# Patient Record
Sex: FEMALE | Race: WHITE | Employment: OTHER | URBAN - METROPOLITAN AREA
[De-identification: names, ages, dates, MRNs, and addresses within clinical notes are randomized per-mention and may not be internally consistent; named-entity substitution may affect disease eponyms.]

---

## 2017-09-12 ENCOUNTER — OFFICE VISIT (OUTPATIENT)
Dept: INTERNAL MEDICINE CLINIC | Age: 57
End: 2017-09-12

## 2017-09-12 VITALS
OXYGEN SATURATION: 96 % | BODY MASS INDEX: 32.21 KG/M2 | WEIGHT: 225 LBS | RESPIRATION RATE: 18 BRPM | DIASTOLIC BLOOD PRESSURE: 72 MMHG | HEART RATE: 74 BPM | SYSTOLIC BLOOD PRESSURE: 119 MMHG | TEMPERATURE: 97.5 F | HEIGHT: 70 IN

## 2017-09-12 DIAGNOSIS — H61.21 IMPACTED CERUMEN OF RIGHT EAR: Primary | ICD-10-CM

## 2017-09-12 DIAGNOSIS — H66.001 ACUTE SUPPURATIVE OTITIS MEDIA OF RIGHT EAR WITHOUT SPONTANEOUS RUPTURE OF TYMPANIC MEMBRANE, RECURRENCE NOT SPECIFIED: ICD-10-CM

## 2017-09-12 PROBLEM — H61.20 CERUMEN IMPACTION: Status: ACTIVE | Noted: 2017-09-12

## 2017-09-12 RX ORDER — OFLOXACIN 3 MG/ML
5 SOLUTION AURICULAR (OTIC) 2 TIMES DAILY
Qty: 5 ML | Refills: 0 | Status: SHIPPED | OUTPATIENT
Start: 2017-09-12 | End: 2017-09-12

## 2017-09-12 RX ORDER — NEOMYCIN SULFATE, POLYMYXIN B SULFATE AND HYDROCORTISONE 10; 3.5; 1 MG/ML; MG/ML; [USP'U]/ML
3 SUSPENSION/ DROPS AURICULAR (OTIC) 4 TIMES DAILY
Qty: 10 ML | Refills: 0 | Status: SHIPPED | OUTPATIENT
Start: 2017-09-12 | End: 2017-09-19

## 2017-09-12 NOTE — PROGRESS NOTES
I performed a right  ear irrigation. Ear wax was removed from right  ear. Patient tolerated the procedure well during and after.

## 2017-09-12 NOTE — PROGRESS NOTES
HISTORY OF PRESENT ILLNESS  Allegra Fisher is a 60 y.o. female. Ear Fullness    The history is provided by the patient. This is a new problem. The current episode started more than 1 week ago. The problem occurs constantly. The problem has not changed since onset. Patient complains that the right ear is affected. There has been no fever. The pain is at a severity of 1/10. The pain is mild. Associated symptoms include hearing loss. Pertinent negatives include no ear discharge, no headaches, no sore throat, no vomiting, no cough and no rash. Her past medical history does not include chronic ear infection. Review of Systems   Constitutional: Negative for chills, fever and malaise/fatigue. HENT: Positive for ear pain and hearing loss. Negative for congestion, ear discharge, nosebleeds, sinus pain, sore throat and tinnitus. Eyes: Negative for blurred vision, double vision and photophobia. Respiratory: Negative for cough, shortness of breath and stridor. Cardiovascular: Negative for chest pain and palpitations. Gastrointestinal: Negative for heartburn, nausea and vomiting. Genitourinary: Negative for dysuria, frequency and urgency. Musculoskeletal: Negative for myalgias. Skin: Negative for rash. Neurological: Negative for dizziness and headaches. Endo/Heme/Allergies: Negative for environmental allergies and polydipsia. Psychiatric/Behavioral: The patient is not nervous/anxious. Physical Exam   Constitutional: She is oriented to person, place, and time. She appears well-developed and well-nourished. No distress. HENT:   Head: Normocephalic and atraumatic. Right Ear: Decreased hearing is noted. Left Ear: Hearing, tympanic membrane and external ear normal.   Nose: No mucosal edema. Mouth/Throat: No oropharyngeal exudate or posterior oropharyngeal edema. Cerumen impaction in right ear. Eyes: EOM are normal. Pupils are equal, round, and reactive to light.  Right eye exhibits no discharge. Left eye exhibits no discharge. Neck: Neck supple. Cardiovascular: Regular rhythm. Pulmonary/Chest: Breath sounds normal. No respiratory distress. She has no wheezes. Lymphadenopathy:     She has no cervical adenopathy. Neurological: She is alert and oriented to person, place, and time. No cranial nerve deficit. Skin: Skin is warm. She is not diaphoretic. Psychiatric: She has a normal mood and affect. Nursing note and vitals reviewed. ASSESSMENT and PLAN    ICD-10-CM ICD-9-CM    1. Impacted cerumen of right ear H61.21 380.4 REMOVAL IMPACTED CERUMEN IRRIGATION/LVG UNILAT   2. Acute suppurative otitis media of right ear without spontaneous rupture of tympanic membrane, recurrence not specified H66.001 382.00 neomycin-polymyxin-hydrocortisone, buffered, (PEDIOTIC) 3.5-10,000-1 mg/mL-unit/mL-% otic suspension      DISCONTINUED: ofloxacin (FLOXIN) 0.3 % otic solution   Patient advised to take medication as prescribed. Take rest and plenty of fluids. Alternate tylenol and ibuprofen for fever. Return to clinic if condition worsens in next 72 hours.

## 2017-09-12 NOTE — MR AVS SNAPSHOT
Visit Information Date & Time Provider Department Dept. Phone Encounter #  
 9/12/2017  4:15 PM Lita Cotto NP [employer] [phone] [id number] Upcoming Health Maintenance Date Due Hepatitis C Screening 1960 DTaP/Tdap/Td series (1 - Tdap) 9/18/1979 PAP AKA CERVICAL CYTOLOGY 9/18/1979 FOBT Q 1 YEAR AGE 50-75 9/18/2008 INFLUENZA AGE 9 TO ADULT 8/1/2017 BREAST CANCER SCRN MAMMOGRAM 9/4/2017 Allergies as of 9/12/2017  Review Complete On: 9/12/2017 By: Lita Cotto NP No Known Allergies Current Immunizations  Never Reviewed No immunizations on file. Not reviewed this visit You Were Diagnosed With   
  
 Codes Comments Impacted cerumen of right ear    -  Primary ICD-10-CM: H61.21 ICD-9-CM: 380.4 Acute suppurative otitis media of right ear without spontaneous rupture of tympanic membrane, recurrence not specified     ICD-10-CM: H66.001 ICD-9-CM: 382.00 Vitals BP Pulse Temp Resp Height(growth percentile) Weight(growth percentile) 119/72 (BP 1 Location: Right arm, BP Patient Position: Sitting) 74 97.5 °F (36.4 °C) (Oral) 18 5' 10\" (1.778 m) 225 lb (102.1 kg) SpO2 BMI OB Status Smoking Status 96% 32.28 kg/m2 Postmenopausal Never Smoker Vitals History BMI and BSA Data Body Mass Index Body Surface Area  
 32.28 kg/m 2 2.25 m 2 Preferred Pharmacy Pharmacy Name Phone [facility] [address] [phone] Your Updated Medication List  
  
   
This list is accurate as of: 9/12/17  4:41 PM.  Always use your most recent med list.  
  
  
  
  
 ofloxacin 0.3 % otic solution Commonly known as:  FLOXIN Administer 5 Drops in right ear two (2) times a day for 7 days. Prescriptions Sent to Pharmacy  Refills  
 ofloxacin (FLOXIN) 0.3 % otic solution 0  
 Sig: Administer 5 Drops in right ear two (2) times a day for 7 days. Class: Normal  
 Pharmacy: [id number] [place] [address] #: [phone] Route: Right Ear We Performed the Following REMOVAL IMPACTED CERUMEN IRRIGATION/LVG UNILAT [id number] CPT(R)] Introducing [facility]! Sara Braswell introduces [employer] patient portal. Now you can access parts of your medical record, email your doctor's office, and request medication refills online. 1. In your internet browser, go to [email or web address] 2. Click on the First Time User? Click Here link in the Sign In box. You will see the New Member Sign Up page. 3. Enter your [employer] Access Code exactly as it appears below. You will not need to use this code after youve completed the sign-up process. If you do not sign up before the expiration date, you must request a new code. · [employer] Access Code: [email or web address] Expires: 12/11/2017  4:41 PM 
 
4. Enter the last four digits of your Social Security Number (xxxx) and Date of Birth (mm/dd/yyyy) as indicated and click Submit. You will be taken to the next sign-up page. 5. Create a [employer] ID. This will be your [employer] login ID and cannot be changed, so think of one that is secure and easy to remember. 6. Create a [employer] password. You can change your password at any time. 7. Enter your Password Reset Question and Answer. This can be used at a later time if you forget your password. 8. Enter your e-mail address. You will receive e-mail notification when new information is available in [address] 9. Click Sign Up. You can now view and download portions of your medical record. 10. Click the Download Summary menu link to download a portable copy of your medical information. If you have questions, please visit the Frequently Asked Questions section of the [employer] website.  Remember, [employer] is NOT to be used for urgent needs. For medical emergencies, dial 911. Now available from your iPhone and Android! Please provide this summary of care documentation to your next provider. Your primary care clinician is listed as Mary Barnes. If you have any questions after today's visit, please call [phone].

## 2017-09-12 NOTE — PROGRESS NOTES
ROOM # 9    Jess Parent presents today for   Chief Complaint   Patient presents with    Ear Fullness     both ears    Hoarse       Jess Parent preferred language for health care discussion is english/other. Is someone accompanying this pt? no    Is the patient using any DME equipment during OV? no    Depression Screening:  PHQ over the last two weeks 9/12/2017 5/12/2016   Little interest or pleasure in doing things Not at all Not at all   Feeling down, depressed or hopeless Not at all Not at all   Total Score PHQ 2 0 0       Learning Assessment:  Learning Assessment 5/12/2016   PRIMARY LEARNER Patient   HIGHEST LEVEL OF EDUCATION - PRIMARY LEARNER  > 4 YEARS OF COLLEGE   BARRIERS PRIMARY LEARNER NONE   PRIMARY LANGUAGE ENGLISH   LEARNER PREFERENCE PRIMARY READING   LEARNING SPECIAL TOPICS no   ANSWERED BY patient    RELATIONSHIP SELF       Abuse Screening:  Abuse Screening Questionnaire 9/12/2017   Do you ever feel afraid of your partner? N   Are you in a relationship with someone who physically or mentally threatens you? N   Is it safe for you to go home? Y       Fall Risk  N/I    Health Maintenance reviewed and discussed per provider. Yes    Jess Parent is due for flu injection . Please order/place referral if appropriate. Advance Directive:  1. Do you have an advance directive in place? Patient Reply: no    2. If not, would you like material regarding how to put one in place? Patient Reply: no    Coordination of Care:  1. Have you been to the ER, urgent care clinic since your last visit? Hospitalized since your last visit? no    2. Have you seen or consulted any other health care providers outside of the [address] [place] [address] since your last visit? Include any pap smears or colon screening.  no

## 2017-09-18 ENCOUNTER — OFFICE VISIT (OUTPATIENT)
Dept: INTERNAL MEDICINE CLINIC | Age: 57
End: 2017-09-18

## 2017-09-18 VITALS
HEIGHT: 70 IN | TEMPERATURE: 98 F | DIASTOLIC BLOOD PRESSURE: 81 MMHG | SYSTOLIC BLOOD PRESSURE: 120 MMHG | OXYGEN SATURATION: 98 % | RESPIRATION RATE: 18 BRPM | HEART RATE: 76 BPM

## 2017-09-18 DIAGNOSIS — H91.91 DECREASED HEARING OF RIGHT EAR: Primary | ICD-10-CM

## 2017-09-18 RX ORDER — OFLOXACIN 3 MG/ML
SOLUTION AURICULAR (OTIC)
Refills: 0 | COMMUNITY
Start: 2017-09-12

## 2017-09-18 RX ORDER — CLOTRIMAZOLE AND BETAMETHASONE DIPROPIONATE 10; .64 MG/G; MG/G
CREAM TOPICAL
Refills: 0 | COMMUNITY
Start: 2017-09-15

## 2017-09-18 NOTE — PATIENT INSTRUCTIONS
Ear Infection (Otitis Media): Care Instructions  Your Care Instructions    An ear infection may start with a cold and affect the middle ear (otitis media). It can hurt a lot. Most ear infections clear up on their own in a couple of days. Most often you will not need antibiotics. This is because many ear infections are caused by a virus. Antibiotics don't work against a virus. Regular doses of pain medicines are the best way to reduce your fever and help you feel better. Follow-up care is a key part of your treatment and safety. Be sure to make and go to all appointments, and call your doctor if you are having problems. It's also a good idea to know your test results and keep a list of the medicines you take. How can you care for yourself at home? · Take pain medicines exactly as directed. ¨ If the doctor gave you a prescription medicine for pain, take it as prescribed. ¨ If you are not taking a prescription pain medicine, take an over-the-counter medicine, such as acetaminophen (Tylenol), ibuprofen (Advil, Motrin), or naproxen (Aleve). Read and follow all instructions on the label. ¨ Do not take two or more pain medicines at the same time unless the doctor told you to. Many pain medicines have acetaminophen, which is Tylenol. Too much acetaminophen (Tylenol) can be harmful. · Plan to take a full dose of pain reliever before bedtime. Getting enough sleep will help you get better. · Try a warm, moist washcloth on the ear. It may help relieve pain. · If your doctor prescribed antibiotics, take them as directed. Do not stop taking them just because you feel better. You need to take the full course of antibiotics. When should you call for help? Call your doctor now or seek immediate medical care if:  · You have new or increasing ear pain. · You have new or increasing pus or blood draining from your ear. · You have a fever with a stiff neck or a severe headache.   Watch closely for changes in your health, and be sure to contact your doctor if:  · You have new or worse symptoms. · You are not getting better after taking an antibiotic for 2 days. Where can you learn more? Go to http://anne-prasad.info/. Enter I541 in the search box to learn more about \"Ear Infection (Otitis Media): Care Instructions. \"  Current as of: May 4, 2017  Content Version: 11.3  © 7418-8009 [employer]. Care instructions adapted under license by [employer] (which disclaims liability or warranty for this information). If you have questions about a medical condition or this instruction, always ask your healthcare professional. [address] any warranty or liability for your use of this information. Hearing Loss: Care Instructions  Your Care Instructions  Hearing loss is a sudden or slow decrease in how well you hear. It can range from mild to severe. Permanent hearing loss can occur with aging. It also can happen when you are exposed long-term to loud noise. Examples include listening to loud music, riding motorcycles, or being around other loud machines. Hearing loss can affect your work and home life. It can make you feel lonely or depressed. You may feel that you have lost your independence. But hearing aids and other devices can help you hear better and feel connected to others. Follow-up care is a key part of your treatment and safety. Be sure to make and go to all appointments, and call your doctor if you are having problems. It's also a good idea to know your test results and keep a list of the medicines you take. How can you care for yourself at home? · Avoid loud noises whenever possible. This helps keep your hearing from getting worse. · Always wear hearing protection around loud noises. · Wear a hearing aid as directed. See a person who can help you pick a hearing aid that fits you. · Have hearing tests as your doctor suggests.  They can show whether your hearing has changed. Your hearing aid may need to be adjusted. · Use other devices as needed. These may include:  ¨ Telephone amplifiers and hearing aids that can connect to a television, stereo, radio, or microphone. ¨ Devices that use lights or vibrations. These alert you to the doorbell, a ringing telephone, or a baby monitor. ¨ Television closed-captioning. This shows the words at the bottom of the screen. Most new TVs can do this. Sammieus Dixon (text telephone). This lets you type messages back and forth on the telephone instead of talking or listening. These devices are also called TDD. When messages are typed on the keyboard, they are sent over the phone line to a receiving TTY. The message is shown on a monitor. · Use pagers, fax machines, and email if it is hard for you to communicate by telephone. · Try to learn a listening technique called speech-reading. It is not lip-reading. You pay attention to people's gestures, expressions, posture, and tone of voice. These clues can help you understand what a person is saying. Face the person you are talking to, and have him or her face you. Make sure the lighting is good. You need to see the other person's face clearly. · Think about counseling if you need help to adjust to your hearing loss. When should you call for help? Watch closely for changes in your health, and be sure to contact your doctor if:  · You think your hearing is getting worse. · You have new symptoms, such as dizziness or nausea. Where can you learn more? Go to http://anne-prasad.info/. Enter Y579 in the search box to learn more about \"Hearing Loss: Care Instructions. \"  Current as of: July 29, 2016  Content Version: 11.3  © 6684-0924 [employer]. Care instructions adapted under license by [employer] (which disclaims liability or warranty for this information).  If you have questions about a medical condition or this instruction, always ask your healthcare professional. [address] any warranty or liability for your use of this information.

## 2017-09-18 NOTE — PROGRESS NOTES
HISTORY OF PRESENT ILLNESS  Lucius Cancino is a 59 y.o. female. Hearing Problem    The history is provided by the patient. This is a new problem. The current episode started more than 1 week ago. The problem occurs constantly. The problem has not changed since onset. Patient complains that the right ear is affected. There has been no fever. The pain is at a severity of 0/10. The patient is experiencing no pain. Associated symptoms include hearing loss. Pertinent negatives include no ear discharge, no headaches, no rhinorrhea, no sore throat, no abdominal pain, no diarrhea, no vomiting, no neck pain, no cough and no rash. Her past medical history is significant for chronic ear infection. Her past medical history does not include hearing loss or tympanostomy tube. Review of Systems   Constitutional: Negative for chills and fever. HENT: Positive for hearing loss. Negative for congestion, ear discharge, ear pain, nosebleeds, rhinorrhea, sinus pain, sore throat and tinnitus. Eyes: Negative for blurred vision, double vision, photophobia, pain and discharge. Respiratory: Negative for cough, sputum production, shortness of breath and stridor. Cardiovascular: Negative for chest pain and palpitations. Gastrointestinal: Negative for abdominal pain, diarrhea, heartburn, nausea and vomiting. Musculoskeletal: Negative for myalgias and neck pain. Skin: Negative for rash. Neurological: Negative for dizziness, tingling, tremors, sensory change and headaches. Endo/Heme/Allergies: Positive for environmental allergies. Negative for polydipsia. Psychiatric/Behavioral: Negative for depression. The patient is not nervous/anxious. Physical Exam   Constitutional: She is oriented to person, place, and time. She appears well-developed and well-nourished. No distress. HENT:   Head: Normocephalic and atraumatic. Right Ear: No drainage, swelling or tenderness. No foreign bodies.  Tympanic membrane is injected and erythematous. Tympanic membrane is not bulging. Tympanic membrane mobility is normal. No middle ear effusion. Decreased hearing is noted. Left Ear: External ear normal. No drainage. Tympanic membrane is not injected and not erythematous. No decreased hearing is noted. Mouth/Throat: Oropharynx is clear and moist. No oropharyngeal exudate. Eyes: EOM are normal. Pupils are equal, round, and reactive to light. Neck: Normal range of motion. Neck supple. Cardiovascular: Regular rhythm. Pulmonary/Chest: Breath sounds normal. No respiratory distress. Lymphadenopathy:     She has no cervical adenopathy. Neurological: She is alert and oriented to person, place, and time. No cranial nerve deficit. Skin: Skin is dry. She is not diaphoretic. No erythema. Psychiatric: She has a normal mood and affect. Nursing note and vitals reviewed. ASSESSMENT and PLAN    ICD-10-CM ICD-9-CM    1.  Decreased hearing of right ear H91.91 389.9 REFERRAL TO ENT-OTOLARYNGOLOGY      PURE TONE HEARING TEST, AIR

## 2017-09-18 NOTE — PROGRESS NOTES
ROOM # 9    Ryanne Huffman presents today for ear infection    Ryanne Huffman preferred language for health care discussion is english/other. Is someone accompanying this pt? no    Is the patient using any DME equipment during OV? no    Depression Screening:  PHQ over the last two weeks 9/12/2017 5/12/2016   Little interest or pleasure in doing things Not at all Not at all   Feeling down, depressed or hopeless Not at all Not at all   Total Score PHQ 2 0 0       Learning Assessment:  Learning Assessment 5/12/2016   PRIMARY LEARNER Patient   HIGHEST LEVEL OF EDUCATION - PRIMARY LEARNER  > 4 YEARS OF COLLEGE   BARRIERS PRIMARY LEARNER NONE   PRIMARY LANGUAGE ENGLISH   LEARNER PREFERENCE PRIMARY READING   LEARNING SPECIAL TOPICS no   ANSWERED BY patient    RELATIONSHIP SELF       Abuse Screening:  Abuse Screening Questionnaire 9/12/2017   Do you ever feel afraid of your partner? N   Are you in a relationship with someone who physically or mentally threatens you? N   Is it safe for you to go home? Y       Fall Risk  No flowsheet data found. Health Maintenance reviewed and discussed per provider. Yes    Ryanne Huffman is due for flu injection . Please order/place referral if appropriate. Advance Directive:  1. Do you have an advance directive in place? Patient Reply: no    2. If not, would you like material regarding how to put one in place? Patient Reply: no    Coordination of Care:  1. Have you been to the ER, urgent care clinic since your last visit? Hospitalized since your last visit? no    2. Have you seen or consulted any other health care providers outside of the [address] [place] [address] since your last visit? Include any pap smears or colon screening.  no

## 2017-09-18 NOTE — MR AVS SNAPSHOT
Visit Information Date & Time Provider Department Dept. Phone Encounter #  
 9/18/2017  4:30 PM Zane Lindsay NP Sherrie Dominguez 139 [id number] Upcoming Health Maintenance Date Due Hepatitis C Screening 1960 DTaP/Tdap/Td series (1 - Tdap) 9/18/1979 PAP AKA CERVICAL CYTOLOGY 9/18/1979 FOBT Q 1 YEAR AGE 50-75 9/18/2008 INFLUENZA AGE 9 TO ADULT 8/1/2017 BREAST CANCER SCRN MAMMOGRAM 9/4/2017 Allergies as of 9/18/2017  Review Complete On: 9/18/2017 By: Evelyn Paniagua No Known Allergies Current Immunizations  Never Reviewed No immunizations on file. Not reviewed this visit You Were Diagnosed With   
  
 Codes Comments Decreased hearing of right ear    -  Primary ICD-10-CM: H91.91 
ICD-9-CM: 389. 9 Vitals BP Pulse Temp Resp Height(growth percentile) SpO2  
 120/81 (BP 1 Location: Right arm, BP Patient Position: Sitting) 76 98 °F (36.7 °C) (Oral) 18 5' 10\" (1.778 m) 98% OB Status Smoking Status Postmenopausal Never Smoker Vitals History Preferred Pharmacy Pharmacy Name Phone [facility] [address] [phone] Your Updated Medication List  
  
   
This list is accurate as of: 9/18/17  4:42 PM.  Always use your most recent med list.  
  
  
  
  
 clotrimazole-betamethasone topical cream  
Commonly known as:  LOTRISONE  
apply QUARTER SIZE AMUNT  twice a day  
  
 neomycin-polymyxin-hydrocortisone (buffered) 3.5-10,000-1 mg/mL-unit/mL-% otic suspension Commonly known as:  Dewitte Ket Administer 3 Drops in right ear four (4) times daily for 7 days. ofloxacin 0.3 % otic solution Commonly known as:  FLOXIN  
instill 5 drops into right ear twice a day for 7 days We Performed the Following REFERRAL TO ENT-OTOLARYNGOLOGY [KLG96 Custom] Comments: Please evaluate patient for decreased hearing post ear infection. Referral Information Referral ID Referred By Referred To  
  
 [id number] Khoa Herrera Not Available Visits Status Start Date End Date 1 New Request 9/18/17 9/18/18 If your referral has a status of pending review or denied, additional information will be sent to support the outcome of this decision. Patient Instructions Ear Infection (Otitis Media): Care Instructions Your Care Instructions An ear infection may start with a cold and affect the middle ear (otitis media). It can hurt a lot. Most ear infections clear up on their own in a couple of days. Most often you will not need antibiotics. This is because many ear infections are caused by a virus. Antibiotics don't work against a virus. Regular doses of pain medicines are the best way to reduce your fever and help you feel better. Follow-up care is a key part of your treatment and safety. Be sure to make and go to all appointments, and call your doctor if you are having problems. It's also a good idea to know your test results and keep a list of the medicines you take. How can you care for yourself at home? · Take pain medicines exactly as directed. ¨ If the doctor gave you a prescription medicine for pain, take it as prescribed. ¨ If you are not taking a prescription pain medicine, take an over-the-counter medicine, such as acetaminophen (Tylenol), ibuprofen (Advil, Motrin), or naproxen (Aleve). Read and follow all instructions on the label. ¨ Do not take two or more pain medicines at the same time unless the doctor told you to. Many pain medicines have acetaminophen, which is Tylenol. Too much acetaminophen (Tylenol) can be harmful. · Plan to take a full dose of pain reliever before bedtime. Getting enough sleep will help you get better. · Try a warm, moist washcloth on the ear. It may help relieve pain. · If your doctor prescribed antibiotics, take them as directed. Do not stop taking them just because you feel better. You need to take the full course of antibiotics. When should you call for help? Call your doctor now or seek immediate medical care if: 
· You have new or increasing ear pain. · You have new or increasing pus or blood draining from your ear. · You have a fever with a stiff neck or a severe headache. Watch closely for changes in your health, and be sure to contact your doctor if: 
· You have new or worse symptoms. · You are not getting better after taking an antibiotic for 2 days. Where can you learn more? Go to http://anne-prasad.info/. Enter B757 in the search box to learn more about \"Ear Infection (Otitis Media): Care Instructions. \" Current as of: May 4, 2017 Content Version: 11.3 © 5216-0939 [employer]. Care instructions adapted under license by [employer] (which disclaims liability or warranty for this information). If you have questions about a medical condition or this instruction, always ask your healthcare professional. [address] any warranty or liability for your use of this information. Hearing Loss: Care Instructions Your Care Instructions Hearing loss is a sudden or slow decrease in how well you hear. It can range from mild to severe. Permanent hearing loss can occur with aging. It also can happen when you are exposed long-term to loud noise. Examples include listening to loud music, riding motorcycles, or being around other loud machines. Hearing loss can affect your work and home life. It can make you feel lonely or depressed. You may feel that you have lost your independence. But hearing aids and other devices can help you hear better and feel connected to others. Follow-up care is a key part of your treatment and safety.  Be sure to make and go to all appointments, and call your doctor if you are having problems. It's also a good idea to know your test results and keep a list of the medicines you take. How can you care for yourself at home? · Avoid loud noises whenever possible. This helps keep your hearing from getting worse. · Always wear hearing protection around loud noises. · Wear a hearing aid as directed. See a person who can help you pick a hearing aid that fits you. · Have hearing tests as your doctor suggests. They can show whether your hearing has changed. Your hearing aid may need to be adjusted. · Use other devices as needed. These may include: ¨ Telephone amplifiers and hearing aids that can connect to a television, stereo, radio, or microphone. ¨ Devices that use lights or vibrations. These alert you to the doorbell, a ringing telephone, or a baby monitor. ¨ Television closed-captioning. This shows the words at the bottom of the screen. Most new TVs can do this. Katina Rand (text telephone). This lets you type messages back and forth on the telephone instead of talking or listening. These devices are also called TDD. When messages are typed on the keyboard, they are sent over the phone line to a receiving TTY. The message is shown on a monitor. · Use pagers, fax machines, and email if it is hard for you to communicate by telephone. · Try to learn a listening technique called speech-reading. It is not lip-reading. You pay attention to people's gestures, expressions, posture, and tone of voice. These clues can help you understand what a person is saying. Face the person you are talking to, and have him or her face you. Make sure the lighting is good. You need to see the other person's face clearly. · Think about counseling if you need help to adjust to your hearing loss. When should you call for help? Watch closely for changes in your health, and be sure to contact your doctor if: 
· You think your hearing is getting worse. · You have new symptoms, such as dizziness or nausea. Where can you learn more? Go to http://anne-prasad.info/. Enter K104 in the search box to learn more about \"Hearing Loss: Care Instructions. \" Current as of: July 29, 2016 Content Version: 11.3 © 7567-4586 [employer]. Care instructions adapted under license by [employer] (which disclaims liability or warranty for this information). If you have questions about a medical condition or this instruction, always ask your healthcare professional. [address] any warranty or liability for your use of this information. Introducing [facility]! Beatrice Moore introduces Thrinacia patient portal. Now you can access parts of your medical record, email your doctor's office, and request medication refills online. 1. In your internet browser, go to [email or web address] 2. Click on the First Time User? Click Here link in the Sign In box. You will see the New Member Sign Up page. 3. Enter your Thrinacia Access Code exactly as it appears below. You will not need to use this code after youve completed the sign-up process. If you do not sign up before the expiration date, you must request a new code. · Thrinacia Access Code: [email or web address] Expires: 12/11/2017  4:41 PM 
 
4. Enter the last four digits of your Social Security Number (xxxx) and Date of Birth (mm/dd/yyyy) as indicated and click Submit. You will be taken to the next sign-up page. 5. Create a MyScreent ID. This will be your Thrinacia login ID and cannot be changed, so think of one that is secure and easy to remember. 6. Create a Thrinacia password. You can change your password at any time. 7. Enter your Password Reset Question and Answer. This can be used at a later time if you forget your password. 8. Enter your e-mail address. You will receive e-mail notification when new information is available in [address] 9. Click Sign Up. You can now view and download portions of your medical record. 10. Click the Download Summary menu link to download a portable copy of your medical information. If you have questions, please visit the Frequently Asked Questions section of the G2Link website. Remember, G2Link is NOT to be used for urgent needs. For medical emergencies, dial 911. Now available from your iPhone and Android! Please provide this summary of care documentation to your next provider. Your primary care clinician is listed as Luiz Wang. If you have any questions after today's visit, please call [phone].

## 2017-09-20 ENCOUNTER — TELEPHONE (OUTPATIENT)
Dept: INTERNAL MEDICINE CLINIC | Age: 57
End: 2017-09-20

## 2018-04-24 NOTE — TELEPHONE ENCOUNTER
Call made to pt, both name and  verified. Pt was advised that she was scheduled for appt with ENT on 17@ 2:15 pm     ENT [facility]  [phone]    Pt declined the appt. Pt states \" I don't have hearing loss in my right ear\" I verbalized understanding and told pt that I would call ENT and cancel the appt.  Pt verbalized understanding and had no further questions Cooperative/Alert/Awake

## 2018-09-21 ENCOUNTER — HOSPITAL ENCOUNTER (OUTPATIENT)
Dept: MAMMOGRAPHY | Age: 58
Discharge: HOME | End: 2018-09-21
Attending: INTERNAL MEDICINE
Payer: COMMERCIAL

## 2018-09-21 DIAGNOSIS — Z12.31 VISIT FOR SCREENING MAMMOGRAM: ICD-10-CM

## 2018-09-21 PROCEDURE — 77063 BREAST TOMOSYNTHESIS BI: CPT

## 2019-12-30 ENCOUNTER — HOSPITAL ENCOUNTER (OUTPATIENT)
Dept: MAMMOGRAPHY | Age: 59
Discharge: HOME | End: 2019-12-30
Attending: PHYSICIAN ASSISTANT
Payer: COMMERCIAL

## 2019-12-30 DIAGNOSIS — Z12.31 VISIT FOR SCREENING MAMMOGRAM: ICD-10-CM

## 2019-12-30 PROCEDURE — 77063 BREAST TOMOSYNTHESIS BI: CPT

## 2021-04-12 ENCOUNTER — TRANSCRIBE ORDER (OUTPATIENT)
Dept: SCHEDULING | Age: 61
End: 2021-04-12

## 2021-04-12 DIAGNOSIS — Z12.31 ENCOUNTER FOR SCREENING MAMMOGRAM FOR MALIGNANT NEOPLASM OF BREAST: Primary | ICD-10-CM

## 2021-05-14 ENCOUNTER — HOSPITAL ENCOUNTER (OUTPATIENT)
Dept: WOMENS IMAGING | Age: 61
Discharge: HOME | End: 2021-05-14
Attending: PHYSICIAN ASSISTANT
Payer: COMMERCIAL

## 2021-05-14 DIAGNOSIS — Z12.31 ENCOUNTER FOR SCREENING MAMMOGRAM FOR MALIGNANT NEOPLASM OF BREAST: ICD-10-CM

## 2021-05-14 PROCEDURE — 77063 BREAST TOMOSYNTHESIS BI: CPT

## 2022-03-19 PROBLEM — H91.91 DECREASED HEARING OF RIGHT EAR: Status: ACTIVE | Noted: 2017-09-18

## 2022-03-20 PROBLEM — H61.20 CERUMEN IMPACTION: Status: ACTIVE | Noted: 2017-09-12

## 2022-03-20 PROBLEM — H66.001 ACUTE SUPPURATIVE OTITIS MEDIA OF RIGHT EAR WITHOUT SPONTANEOUS RUPTURE OF TYMPANIC MEMBRANE: Status: ACTIVE | Noted: 2017-09-12

## 2022-06-22 ENCOUNTER — HOSPITAL ENCOUNTER (OUTPATIENT)
Dept: PHYSICAL THERAPY | Age: 62
Discharge: HOME | End: 2022-06-22
Payer: COMMERCIAL

## 2022-06-22 PROCEDURE — 97110 THERAPEUTIC EXERCISES: CPT

## 2022-06-22 PROCEDURE — 97162 PT EVAL MOD COMPLEX 30 MIN: CPT

## 2022-06-22 NOTE — PROGRESS NOTES
[address] [facility] PHYSICAL THERAPY  [x]  At [facility]. [address] [place] [address] [phone] - Phone: [phone] Fax: [phone] / 6076 [facility]  Patient Name: Parish Daley : 1960   Medical   Diagnosis: Stress incontinence (female) (male) [N39.3] Treatment Diagnosis: Stress incontinence (female) (male) [N39.3]   Onset Date: 2022     Referral Source: Nam Mcmullen MD [facility]): 2022   Prior Hospitalization: See medical history Provider #: [id number]   Prior Level of Function: Chronic for the last 3 years. Comorbidities: , Menopause   Medications: Verified on Patient Summary List   The Plan of Care and following information is based on the information from the initial evaluation.   ==================================================================================  Assessment / key information: Patient is a 59 y.o. yo female  with vaginal deliveries who presents to [facility] with diagnosis of Stress incontinence (female) (male) [N39.3]. Patient reports urinary leaking with activity such as sneezing occurring almost everyday, nocturia 2-3x nightly and frequent urination at every 1.5 to 2 hours during the day. She can also have UI with forceful activity like vomiting. She can have strong urgency with nocturia. Patient wears minipads for protection. Bowel movements are 1x day with inconsistent straining. Patient presents to PT with severely impaired strength of pelvic floor muscles scoring 1/0/0/10  on PERF. On biofeedback there was low net rise of  fast twitch contraction at 3.09 microvolts and low net rise with poor quality of slow twitch contraction at . 99 microvolts. Patient scored 53 on FOTO/Urinary problem indicating decreased quality of life.   Patient can benefit from PT for retraining of muscle control on biofeedback to increase pelvic floor muscle strength, decrease urinary incontinence, frequency of urination and nocturia.    ==================================================================================  Eval Complexity: History: HIGH Complexity :3+ comorbidities / personal factors will impact the outcome/ POC Exam:HIGH Complexity : 4+ Standardized tests and measures addressing body structure, function, activity limitation and / or participation in recreation  Presentation: MEDIUM Complexity : Evolving with changing characteristics  Clinical Decision Making:MEDIUM Complexity : FOTO score of 26-74Overall Complexity:MEDIUM  Problem List: Pelvic pain/dysfunction, Decreased pelvic floor mm awareness, Decreased pelvic floor mm strength, Urinary urgency and Other  Treatment Plan may include any combination of the following: Therapeutic exercise, Urge suppression techniques, Neuromuscular re-education, Manual therapy, Physical agent/modality, Patient education and Other  Patient / Family readiness to learn indicated by: asking questions, trying to perform skills and interest  Persons(s) to be included in education: patient (P)  Barriers to Learning/Limitations: None  Measures taken:    Patient Goal (s): \"Bladder control\"   Patient self reported health status: excellent  Rehabilitation Potential: good  Short Term Goals: To be accomplished in 4 weeks:   1. Patient performing pelvic floor exercises 3x day. 2. Patient will report 20% subjective improvement in urinary incontinence with activity such as sneezing. 3. Patient will increase net rise of fast twitch contraction by 5 microvolts to increase continence. 4. Patient using urge suppression techniques. Long Term Goals: To be accomplished in 8 weeks:   1. Patient independent in HEP     2. Patient will increase sore on FOTO/Urinary Problem to 63 indicating improved continence and quality of life. 3. Patient will increase net rise of fast twitch contraction by 10 microvolts to increase continence. 4. Nocturia decreased to 1x nightly. Frequency / Duration:   Patient to be seen  1  times per week for 8  weeks:  Patient / Caregiver education and instruction:    Therapist Signature: Reilly Robertson PT Date: 7/21/9935   Certification Period: na Time: 11:23 AM   ==================================================================================  I certify that the above Physical Therapy Services are being furnished while the patient is under my care. I agree with the treatment plan and certify that this therapy is necessary. Physician Signature:        Date:       Time:     Please sign and return to [facility] or you may fax the signed copy to [phone]. Thank you.     Aylin Olmstead MD

## 2022-06-22 NOTE — PROGRESS NOTES
PELVIC FLOOR DAILY TREATMENT NOTE 8-    Patient Name: Raymundo Butler  Date:2022  : 1960  [x]  Patient  Verified  Payor: BLUE CROSS / Plan:  [facility] [place] / Product Type: PPO /    In time:11:26  Out time:12:11  Total Treatment Time (min): 45  Total Timed Codes (min): 15  1:1 Treatment Time (min): 15   Visit #: 1 of 8    Treatment Area: Stress incontinence (female) (male) [N39.3]    SUBJECTIVE  Pain Level (0-10 scale): 0  Any medication changes, allergies to medications, adverse drug reactions, diagnosis change, or new procedure performed?: [x] No    [] Yes (see summary sheet for update)  Subjective functional status/changes:   [] No changes reported  See POC    OBJECTIVE      Billed As:   [x] TE 15 min   [] TA   [] Neuro   [] Self Care Patient Education: [x] Review HEP /POC/Goals     Educated Pt in pelvic floor anatomy, function/dysfunction and correct execution of a pelvic floor contraction. Reviewed biofeedback results. [] Progressed/Changed HEP based on:   [] positioning   [] body mechanics   [] transfers   [] heat/ice application    [] other:        Pain Level (0-10 scale) post treatment: 0    ASSESSMENT/Changes in Function:   Justification for Eval Code Complexity:  Patient History : See POC  Examination see exam   Clinical Presentation: evolving  Clinical Decision Making : FOTO : 53 /100    Patient will continue to benefit from skilled PT services to modify and progress therapeutic interventions, address strength deficits, instruct in home and community integration and Address UI, nocturia and increased urinary frequency to attain remaining goals. [x]  See Plan of Care  []  See progress note/recertification  []  See Discharge Summary         Progress towards goals / Updated goals:  Initial evaluation completed with home exercise program and education initiated.        PLAN  [x]  Upgrade activities as tolerated     []  Continue plan of care  []  Update interventions per flow sheet       []  Discharge due to:_  []  Other:_      Beatrice Randall, PT 6/22/2022  12:11 PM      Future Appointments   Date Time Provider Dez Arzate   7/14/2022 10:30 AM Yasmeen Monk,  [facility]   7/21/2022 10:30 AM Yasmeen Monk,  [facility]   8/4/2022 10:30 AM Yasmeen Monk,  [facility]   8/11/2022 10:30 AM Yasmeen Monk,  [facility]   8/18/2022 10:30 AM Yasmeen Monk,  [facility]   8/25/2022 10:30 AM Yasmeen Monk,  [facility]   9/1/2022 10:30 AM Yasmeen Monk,  [facility]

## 2022-07-14 ENCOUNTER — HOSPITAL ENCOUNTER (OUTPATIENT)
Dept: PHYSICAL THERAPY | Age: 62
Discharge: HOME | End: 2022-07-14
Payer: COMMERCIAL

## 2022-07-14 ENCOUNTER — TRANSCRIBE ORDER (OUTPATIENT)
Dept: SCHEDULING | Age: 62
End: 2022-07-14

## 2022-07-14 DIAGNOSIS — Z12.31 VISIT FOR SCREENING MAMMOGRAM: Primary | ICD-10-CM

## 2022-07-14 PROCEDURE — 97110 THERAPEUTIC EXERCISES: CPT

## 2022-07-14 PROCEDURE — 97530 THERAPEUTIC ACTIVITIES: CPT

## 2022-07-14 PROCEDURE — 97112 NEUROMUSCULAR REEDUCATION: CPT

## 2022-07-14 NOTE — PROGRESS NOTES
PELVIC FLOOR DAILY TREATMENT NOTE  1-    Patient Name: Zaynab Lerma  Date:2022  : 1960  [x]  Patient  Verified  Payor: ANTHONY MUSTAFA / Plan: Joe Emanuel / Product Type: PPO /    In time:10:27  Out time:11:28  Total Treatment Time (min): 61    (for [employer] and BC[employer] only)  Total Timed Codes (min): 61  1:1 Treatment Time (min): 61     Visit #: 2 of 8    Treatment Area: Stress incontinence (female) (male) [N39.3]    SUBJECTIVE  Pain Level (0-10 scale): 2 low back  Any medication changes, allergies to medications, adverse drug reactions, diagnosis change, or new procedure performed?: [x] No    [] Yes (see summary sheet for update)  Subjective functional status/changes:   [] No changes reported  Patient reports doing HEP 3x day and then whenever she thinks about it. 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 Getting better. OBJECTIVE  Modality rationale: Increase pelvic floor muscle strength and Improve quality of pelvic floor contractions in order to Increase urinary continence, Decrease urinary urgency, Increase ability to delay urination, Decrease frequency of urination and Decrease nocturia.    Min Type Additional Details   36 [x] Biofeedback x 36 minutes    supine surface    [] Estim: []Att   []Unatt        []TENS instruct                  []IFC  []Premod   []NMES                     []Other:  []w/US   []w/ice   []w/heat  Position:  Location:    []  Traction: [] Cervical       []Lumbar                       [] Prone          []Supine                       []Intermittent   []Continuous Lbs:  [] before manual  [] after manual    []  Ultrasound: []Continuous   [] Pulsed                           []1MHz   []3MHz Location:  W/cm2:    []  Iontophoresis with dexamethasone         Location: [] Take home patch   [] In clinic    []  Ice     []  heat  []  Ice massage Position:  Location:    []  Vasopneumatic Device Pressure:       [] lo [] med [] hi   Temperature: [] lo [] med [] hi   [x] Skin assessment post-treatment:  [x]intact []redness- no adverse reaction       []redness - adverse reaction:      min Therapeutic Exercise:  [] See flow sheet :  []  Pelvic floor strengthening                []  Pelvic floor downtraining  []  Quality pelvic floor contractions      []  Relaxation techniques  []  Urge suppression exercises  []  Other: Core strengthening   Rationale: na in order to na. 15 min Therapeutic Activity:  []  See flow sheet : Sleep longer education and exercises   Rationale: Inhibit abnormal muscle activity and increase LE circulation before bed time in order to Decrease urinary urgency, Increase ability to delay urination, Decrease frequency of urination and Decrease nocturia. min Manual Therapy:    Rationale: na in order to na. The manual therapy interventions were performed at a separate and distinct time from the therapeutic activities interventions. Billed as:   [x] TE 10 min   [] TA   [] Neuro   [] Self Care Patient Education: [x] Review HEP  Bladder diary. [x] Progressed/Changed HEP based on: advance pelvic floor HEP to 2x day supine, 1x day seated with 5 second holds. Do sleep longer exercises. Complete bladder diary. [] positioning   [] body mechanics   [] transfers   [] heat/ice application    [] other:        Other Objective/Functional Measures:    [x]baseline resting tone: na   [x]slow twitch mms 6.8(3.88) in supine. [x]fast twitch mms 9.3(3.71) with goal.    Pain Level (0-10 scale) post treatment: 2    ASSESSMENT/Changes in Function: Patient demonstrates improved strength of pelvic floor muscles with good HEP compliance. Patient will continue to benefit from skilled PT services to modify and progress therapeutic interventions, address strength deficits, instruct in home and community integration and Address UI, nocturia and increased urinary frequency to attain remaining goals.        []  See Plan of Care  []  See progress note/recertification  []  See Discharge Summary         Progress towards goals / Updated goals: Goal #1 met with patient performing HEP 3+ times a day. 1. Patient performing pelvic floor exercises 3x day. 2. Patient will report 20% subjective improvement in urinary incontinence with activity such as sneezing. 3. Patient will increase net rise of fast twitch contraction by 5 microvolts to increase continence. 4. Patient using urge suppression techniques.     PLAN  [x]  Upgrade activities as tolerated     []  Continue plan of care  []  Update interventions per flow sheet       []  Discharge due to:_  []  Other:_      Zeke Cruz, PT 7/14/2022  11:28 AM      Future Appointments   Date Time Provider Dez Arzate   7/14/2022 10:30 AM Charles Marco A, PT [facility]   7/21/2022 10:30 AM Dawson Marco A, PT [facility]   8/4/2022 10:30 AM Charles Marco A, PT [facility]   8/11/2022 10:30 AM Charles Marco A, PT [facility]   8/18/2022 10:30 AM Charles Marco A, PT [facility]   8/25/2022 10:30 AM Charles Marco A, PT [facility]   9/1/2022 10:30 AM Dawson Marco A, PT [facility]

## 2022-07-19 ENCOUNTER — HOSPITAL ENCOUNTER (OUTPATIENT)
Dept: WOMENS IMAGING | Age: 62
Discharge: HOME | End: 2022-07-19
Attending: INTERNAL MEDICINE
Payer: COMMERCIAL

## 2022-07-19 DIAGNOSIS — Z12.31 VISIT FOR SCREENING MAMMOGRAM: ICD-10-CM

## 2022-07-19 PROCEDURE — 77063 BREAST TOMOSYNTHESIS BI: CPT

## 2022-07-21 ENCOUNTER — HOSPITAL ENCOUNTER (OUTPATIENT)
Dept: PHYSICAL THERAPY | Age: 62
Discharge: HOME | End: 2022-07-21
Payer: COMMERCIAL

## 2022-07-21 PROCEDURE — 97112 NEUROMUSCULAR REEDUCATION: CPT

## 2022-07-21 PROCEDURE — 97110 THERAPEUTIC EXERCISES: CPT

## 2022-07-21 NOTE — PROGRESS NOTES
PELVIC FLOOR DAILY TREATMENT NOTE      Patient Name: Eddi Marin  Date:2022  : 1960  [x]  Patient  Verified  Payor: BLUE CROSS / Plan:  [facility] / Product Type: PPO /    In time:10:15  Out time:11:30  Total Treatment Time (min): 60    (for MC and [employer] only)  Total Timed Codes (min): 60  1:1 Treatment Time (min): 60     Visit #: 3 of 8    Treatment Area: Stress incontinence (female) (male) [N39.3]    SUBJECTIVE  Pain Level (0-10 scale): 045 low back  Any medication changes, allergies to medications, adverse drug reactions, diagnosis change, or new procedure performed?: [x] No    [] Yes (see summary sheet for update)  Subjective functional status/changes:   [] No changes reported  Patient reports doing sleep exercises. Getting up 1x night. Feels like things are getting better. Not leaking as much with sneezing. Not wearing pads. OBJECTIVE  Modality rationale: Increase pelvic floor muscle strength and Improve quality of pelvic floor contractions in order to Increase urinary continence, Decrease urinary urgency, Increase ability to delay urination, Decrease frequency of urination and Decrease nocturia.    Min Type Additional Details   45 [x] Biofeedback x 45 minutes    supine surface    [] Estim: []Att   []Unatt        []TENS instruct                  []IFC  []Premod   []NMES                     []Other:  []w/US   []w/ice   []w/heat  Position:  Location:    []  Traction: [] Cervical       []Lumbar                       [] Prone          []Supine                       []Intermittent   []Continuous Lbs:  [] before manual  [] after manual    []  Ultrasound: []Continuous   [] Pulsed                           []1MHz   []3MHz Location:  W/cm2:    []  Iontophoresis with dexamethasone         Location: [] Take home patch   [] In clinic    []  Ice     []  heat  []  Ice massage Position:  Location:    []  Vasopneumatic Device Pressure:       [] lo [] med [] hi   Temperature: [] lo [] med [] hi   [x] Skin assessment post-treatment:  [x]intact []redness- no adverse reaction       []redness - adverse reaction:      min Therapeutic Exercise:  [] See flow sheet :  []  Pelvic floor strengthening                []  Pelvic floor downtraining  []  Quality pelvic floor contractions      []  Relaxation techniques  []  Urge suppression exercises  []  Other: Core strengthening   Rationale:  na  in order to  na .     min Therapeutic Activity:  []  See flow sheet :    Rationale: Inhibit abnormal muscle activity and increase LE circulation before bed time  in order to Decrease urinary urgency, Increase ability to delay urination, Decrease frequency of urination and Decrease nocturia. min Manual Therapy:    Rationale:  na  in order to  na . The manual therapy interventions were performed at a separate and distinct time from the therapeutic activities interventions. Billed as:   [x] TE 15 min   [] TA   [] Neuro   [] Self Care Patient Education: [x] Review HEP  Review of bladder diary and sleep longer protocol  [x] Progressed/Changed HEP based on: Continue pelvic floor HEP to 2x day supine, 1x day seated with 5 second holds advancing to 2x day seated and 1x day supine in one week. Continue sleep longer exercises. [] positioning   [] body mechanics   [] transfers   [] heat/ice application    [] other:        Other Objective/Functional Measures:    [x]baseline resting tone: na   [x]slow twitch mms 7.8(4.45) supine. [x]fast twitch mms 9.2(4.27)    Pain Level (0-10 scale) post treatment: 0    ASSESSMENT/Changes in Function: Bladder diary indicates daytime urination WNLs at 6x average of 3 days   Patient will continue to benefit from skilled PT services to modify and progress therapeutic interventions, address strength deficits, instruct in home and community integration and Address UI, nocturia and increased urinary frequency to attain remaining goals.        []  See Plan of Care  []  See progress note/recertification  []  See Discharge Summary         Progress towards goals / Updated goals: Patient progressing with goal #2 in that she reports decreased leakage with sneezing. 1. Patient performing pelvic floor exercises 3x day. 2. Patient will report 20% subjective improvement in urinary incontinence with activity such as sneezing. 3. Patient will increase net rise of fast twitch contraction by 5 microvolts to increase continence. 4. Patient using urge suppression techniques.     PLAN  [x]  Upgrade activities as tolerated     []  Continue plan of care  []  Update interventions per flow sheet       []  Discharge due to:_  []  Other:_      Jackson De Leon, PT 7/21/2022  11:30 AM      Future Appointments   Date Time Provider Dez Arzate   7/21/2022 10:30 AM Parul Brenner, PT [facility]   8/4/2022 10:30 AM Parul Brenner, PT [facility]   8/11/2022 10:30 AM Parul Brenner, PT [facility]   8/18/2022 10:30 AM Parul Brenner, PT [facility]   9/1/2022 10:30 AM Parul Brenner, PT [facility]

## 2022-08-04 ENCOUNTER — HOSPITAL ENCOUNTER (OUTPATIENT)
Dept: PHYSICAL THERAPY | Age: 62
Discharge: HOME | End: 2022-08-04
Payer: COMMERCIAL

## 2022-08-04 PROCEDURE — 97110 THERAPEUTIC EXERCISES: CPT

## 2022-08-04 PROCEDURE — 97112 NEUROMUSCULAR REEDUCATION: CPT

## 2022-08-04 NOTE — PROGRESS NOTES
PELVIC FLOOR DAILY TREATMENT NOTE      Patient Name: Dariana Standard  NEED:1348  : 1960  [x]  Patient  Verified  Payor: BLUE CROSS / Plan:  [facility] [place] / Product Type: PPO /    In time:10:32 Out time:11:22  Total Treatment Time (min): 50    (for MC and [employer] only)  Total Timed Codes (min): 50  1:1 Treatment Time (min): 50     Visit #: 4 of 8    Treatment Area: Stress incontinence (female) (male) [N39.3]    SUBJECTIVE  Pain Level (0-10 scale): 0 low back  Any medication changes, allergies to medications, adverse drug reactions, diagnosis change, or new procedure performed?: [x] No    [] Yes (see summary sheet for update)  Subjective functional status/changes:   [] No changes reported  See PN  OBJECTIVE  Modality rationale: Increase pelvic floor muscle strength and Improve quality of pelvic floor contractions in order to Increase urinary continence, Decrease urinary urgency, Increase ability to delay urination, Decrease frequency of urination and Decrease nocturia.    Min Type Additional Details   35 [x] Biofeedback x 35 minutes    seated surface    [] Estim: []Att   []Unatt        []TENS instruct                  []IFC  []Premod   []NMES                     []Other:  []w/US   []w/ice   []w/heat  Position:  Location:    []  Traction: [] Cervical       []Lumbar                       [] Prone          []Supine                       []Intermittent   []Continuous Lbs:  [] before manual  [] after manual    []  Ultrasound: []Continuous   [] Pulsed                           []1MHz   []3MHz Location:  W/cm2:    []  Iontophoresis with dexamethasone         Location: [] Take home patch   [] In clinic    []  Ice     []  heat  []  Ice massage Position:  Location:    []  Vasopneumatic Device Pressure:       [] lo [] med [] hi   Temperature: [] lo [] med [] hi   [x] Skin assessment post-treatment:  [x]intact []redness- no adverse reaction       []redness - adverse reaction:      min Therapeutic Exercise:  [] See flow sheet :  []  Pelvic floor strengthening                []  Pelvic floor downtraining  []  Quality pelvic floor contractions      []  Relaxation techniques  []  Urge suppression exercises  []  Other: Core strengthening   Rationale:  na  in order to  na .     min Therapeutic Activity:  []  See flow sheet :    Rationale: Inhibit abnormal muscle activity and increase LE circulation before bed time  in order to Decrease urinary urgency, Increase ability to delay urination, Decrease frequency of urination and Decrease nocturia. min Manual Therapy:    Rationale:  na  in order to  na . The manual therapy interventions were performed at a separate and distinct time from the therapeutic activities interventions. Billed as:   [x] TE 15 min   [] TA   [] Neuro   [] Self Care Patient Education: [x] Review HEP  Discussed progress including functional improvements and continued functional impairments. [x] Progressed/Changed HEP based on: Pelvic floor HEP  2x day seated and 1x day supine with add. Continue sleep longer exercises. [] positioning   [] body mechanics   [] transfers   [] heat/ice application    [] other:        Other Objective/Functional Measures:    []baseline resting tone: na   [x]slow twitch mms    [x]fast twitch mms     Pain Level (0-10 scale) post treatment: 0    ASSESSMENT/Changes in Function: See PN    Patient will continue to benefit from skilled PT services to modify and progress therapeutic interventions, address strength deficits, instruct in home and community integration and Address UI, nocturia and increased urinary frequency to attain remaining goals.        []  See Plan of Care  [x]  See progress note/recertification  []  See Discharge Summary         Progress towards goals / Updated goals: See PN    PLAN  [x]  Upgrade activities as tolerated     []  Continue plan of care  []  Update interventions per flow sheet       []  Discharge due to:_  []  Other:_      Adalberto Pratt, PT 8/4/2022  11:22 AM      Future Appointments   Date Time Provider Dez Arzate   8/4/2022 10:30 AM Zoe Odonnell, PT [facility]   8/11/2022 10:30 AM Zoe Odonnell, PT [facility]   8/18/2022 10:30 AM Zoe Odonnell, PT [facility]   9/1/2022 10:30 AM Zoe Odonnell, PT [facility]   9/8/2022 10:30 AM Zoe Odonnell, PT [facility]

## 2022-08-04 NOTE — PROGRESS NOTES
[facility] PHYSICAL THERAPY    [x]  At [facility] [address] [facility]. [address] [place] [id number] - Phone: [phone] Fax: [phone]  PROGRESS NOTE  Patient Name: Robert Bergeron : 1960   Treatment/Medical Diagnosis: Stress incontinence (female) (male) [N39.3]   Referral Source: Mack Briones MD     Date of Initial Visit: 2022 Attended Visits: 4 Missed Visits: 0   SUMMARY OF TREATMENT  PT has consisted of pelvic floor relaxation/strengthening via biofeedback, education as to pelvic floor anatomy and function/bladder diary/sleep longer exercises  and home exercise program.   CURRENT STATUS  Patient has made slow progress in PT with short term goals either met. or progressing. Functional progress Includes patient reporting 50-60% improvement in MATT and nocturia decreased from 2-3x nightly to 1-2x nightly. Patient has been slow to strengthen the  pelvic floor muscles so accessory muscle use has been added. Goal/Measure of Progress Goal Met? 1. Patient performing pelvic floor exercises 3x day   Status at last Eval: na Current Status: 3x day yes   2. Patient will report 20% subjective improvement in urinary incontinence with activity such as sneezing. Status at last Eval: na Current Status: 50-60% yes   3. Increase net rise of fast twitch contraction by 5 microvolts to increase continence. 4.  Patient using urge suppression techniques. Status at last Eval: 3.09 micro volts  na Current Status: 4.53 micro volts with accessory muscle use  ongoing Progressing  ongoing   New Goals to be achieved in __4__  weeks:                1 . Patient independent in HEP                   2. Patient will increase sore on FOTO/Urinary Problem to 63 indicating improved continence and quality of life. 3. Patient will increase net rise of fast twitch contraction by 5 microvolts to increase continence.                    4. Nocturia decreased to 1x nightly. RECOMMENDATIONS  Continue pelvic floor PT 1x week for 4 weeks. If you have any questions/comments please contact us directly at [phone]. Thank you for allowing us to assist in the care of your patient. Therapist Signature: Asha Romo PT Date: 8/4/2022     Time: 12:36 PM   NOTE TO PHYSICIAN:  PLEASE COMPLETE THE ORDERS BELOW AND FAX TO   [facility]: [phone]  If you are unable to process this request in 24 hours please contact our office: [phone].    ___ I have read the above report and request that my patient continue as recommended.   ___ I have read the above report and request that my patient continue therapy with the following changes/special instructions:_________________________________________________________   ___ I have read the above report and request that my patient be discharged from therapy.      Physician Signature:        Date:       Time:      Rexene Holter, MD

## 2022-08-11 ENCOUNTER — APPOINTMENT (OUTPATIENT)
Dept: PHYSICAL THERAPY | Age: 62
End: 2022-08-11
Payer: COMMERCIAL

## 2022-08-18 ENCOUNTER — HOSPITAL ENCOUNTER (OUTPATIENT)
Dept: PHYSICAL THERAPY | Age: 62
Discharge: HOME | End: 2022-08-18
Payer: COMMERCIAL

## 2022-08-18 PROCEDURE — 97014 ELECTRIC STIMULATION THERAPY: CPT

## 2022-08-18 PROCEDURE — 97112 NEUROMUSCULAR REEDUCATION: CPT

## 2022-08-18 NOTE — PROGRESS NOTES
PELVIC FLOOR DAILY TREATMENT NOTE  1-    Patient Name: Lizett Arizmendi  Date:2022  : 1960  [x]  Patient  Verified  Payor: BLUE CROSS / Plan:  [facility] / Product Type: PPO /    In time:10:30 Out time:11:15  Total Treatment Time (min): 45    (for MC and BCBS only)  Total Timed Codes (min): 35  1:1 Treatment Time (min): 35     Visit #: 5 of 8    Treatment Area: Stress incontinence (female) (male) [N39.3]    SUBJECTIVE  Pain Level (0-10 scale): 0 low back  Any medication changes, allergies to medications, adverse drug reactions, diagnosis change, or new procedure performed?: [x] No    [] Yes (see summary sheet for update)  Subjective functional status/changes:   [] No changes reported  Patient reports doing HEP 3x day but gets leaking in lying down position. Nocturia is 1x nightly. OBJECTIVE  Modality rationale: Increase pelvic floor muscle strength and Improve quality of pelvic floor contractions in order to Increase urinary continence, Decrease urinary urgency, Increase ability to delay urination, Decrease frequency of urination and Decrease nocturia.    Min Type Additional Details   35 [x] Biofeedback x 35 minutes    seated surface   10 [x] Estim: []Att   [x]Unatt        []TENS instruct                  []IFC  []Premod   [x]NMES 50 Hz 5/10 intensity 19                    []Other:  []w/US   []w/ice   []w/heat  Position: supine  Location: [facility]    []  Traction: [] Cervical       []Lumbar                       [] Prone          []Supine                       []Intermittent   []Continuous Lbs:  [] before manual  [] after manual    []  Ultrasound: []Continuous   [] Pulsed                           []1MHz   []3MHz Location:  W/cm2:    []  Iontophoresis with dexamethasone         Location: [] Take home patch   [] In clinic    []  Ice     []  heat  []  Ice massage Position:  Location:    []  Vasopneumatic Device Pressure:       [] lo [] med [] hi   Temperature: [] lo [] med [] hi   [x] Skin assessment post-treatment:  [x]intact []redness- no adverse reaction       []redness - adverse reaction:      min Therapeutic Exercise:  [] See flow sheet :  []  Pelvic floor strengthening                []  Pelvic floor downtraining  []  Quality pelvic floor contractions      []  Relaxation techniques  []  Urge suppression exercises  []  Other: Core strengthening   Rationale:  na  in order to  na .     min Therapeutic Activity:  []  See flow sheet :    Rationale: Inhibit abnormal muscle activity and increase LE circulation before bed time  in order to Decrease urinary urgency, Increase ability to delay urination, Decrease frequency of urination and Decrease nocturia. min Manual Therapy:    Rationale:  na  in order to  na . The manual therapy interventions were performed at a separate and distinct time from the therapeutic activities interventions. Billed with:   [] TE  min   [] TA   [x] Neuro   [] Self Care Patient Education: [x] Review HEP  Discussed indications and contraindications to using NMES. Patient agrees to try. [x] Progressed/Changed HEP based on: Continue pelvic floor HEP  2x day seated and 1x day supine with add. Continue sleep longer exercises. [] positioning   [] body mechanics   [] transfers   [] heat/ice application    [] other:        Other Objective/Functional Measures:    []baseline resting tone: na   [x]slow twitch mms 9 (3.29) seated. [x]fast twitch mms 12.1(4.46). Pain Level (0-10 scale) post treatment: 0    ASSESSMENT/Changes in Function:  Added NMES to pelvic floor muscles secondary to patient slow to strengthen even with accessory muscle use. Patient will continue to benefit from skilled PT services to modify and progress therapeutic interventions, address strength deficits, instruct in home and community integration and Address UI, nocturia and increased urinary frequency to attain remaining goals.        []  See Plan of Care  []  See progress note/recertification  []  See Discharge Summary         Progress towards goals / Updated goals: Patient achieved LTG #4 with patient reporting nocturia 1x nightly                   1 . Patient independent in HEP                   2. Patient will increase sore on FOTO/Urinary Problem to 63 indicating improved continence and quality of life. 3. Patient will increase net rise of fast twitch contraction by 5 microvolts to increase continence. 4. Nocturia decreased to 1x nightly.   PLAN  [x]  Upgrade activities as tolerated     []  Continue plan of care  []  Update interventions per flow sheet       []  Discharge due to:_  []  Other:_      Jak Wilder, PT 8/18/2022  11:15 AM      Future Appointments   Date Time Provider Dez Arzate   8/18/2022 10:30 AM Marquita Phillips, PT [facility]   9/1/2022 10:30 AM Marquita Phillips, PT [facility]   9/8/2022 10:30 AM Marquita Phillips, PT [facility]   9/15/2022 10:30 AM Marquita Phillips, PT [facility]

## 2022-08-25 ENCOUNTER — APPOINTMENT (OUTPATIENT)
Dept: PHYSICAL THERAPY | Age: 62
End: 2022-08-25
Payer: COMMERCIAL

## 2022-09-01 ENCOUNTER — APPOINTMENT (OUTPATIENT)
Dept: PHYSICAL THERAPY | Age: 62
End: 2022-09-01

## 2022-09-08 ENCOUNTER — APPOINTMENT (OUTPATIENT)
Dept: PHYSICAL THERAPY | Age: 62
End: 2022-09-08

## 2022-09-15 ENCOUNTER — APPOINTMENT (OUTPATIENT)
Dept: PHYSICAL THERAPY | Age: 62
End: 2022-09-15

## 2022-09-19 NOTE — PROGRESS NOTES
[address] [place] [address] PHYSICAL THERAPY  [address] [place] [address] 201,[facility], [facility] - Phone: [phone]  Fax: [phone]    DISCHARGE NOTE  Patient Name: Elma Das : 1960   Treatment/Medical Diagnosis: Stress incontinence (female) (male) [N39.3]   Referral Source: Osvaldo Saldana MD     Date of Initial Visit: 2022 Attended Visits: 5 Missed Visits: 4       SUMMARY OF TREATMENT  Pt attended only initial evaluation and     4   follow-ups and then did not return. Therefore a formal reassessment of goals was not performed. RECOMMENDATIONS  Discontinue physical therapy due to patient not returning. If you have any questions/comments please contact us directly at [phone]. Thank you for allowing us to assist in the care of your patient. Therapist Signature:  Quang Mcintyre PT Date: 2022     Time: 11:10 AM

## 2023-09-11 ENCOUNTER — HOSPITAL ENCOUNTER (OUTPATIENT)
Dept: WOMENS IMAGING | Facility: HOSPITAL | Age: 63
Discharge: HOME | End: 2023-09-14
Payer: MEDICARE

## 2023-09-11 DIAGNOSIS — Z12.31 VISIT FOR SCREENING MAMMOGRAM: ICD-10-CM

## 2023-09-11 PROCEDURE — 77063 BREAST TOMOSYNTHESIS BI: CPT

## 2024-10-17 ENCOUNTER — HOSPITAL ENCOUNTER (OUTPATIENT)
Dept: WOMENS IMAGING | Facility: HOSPITAL | Age: 64
Discharge: HOME | End: 2024-10-20
Payer: MEDICARE

## 2024-10-17 DIAGNOSIS — Z12.31 VISIT FOR SCREENING MAMMOGRAM: ICD-10-CM

## 2024-10-17 PROCEDURE — 77063 BREAST TOMOSYNTHESIS BI: CPT
